# Patient Record
Sex: FEMALE
[De-identification: names, ages, dates, MRNs, and addresses within clinical notes are randomized per-mention and may not be internally consistent; named-entity substitution may affect disease eponyms.]

---

## 2020-01-23 ENCOUNTER — APPOINTMENT (OUTPATIENT)
Dept: OTOLARYNGOLOGY | Facility: CLINIC | Age: 30
End: 2020-01-23
Payer: SELF-PAY

## 2020-01-23 VITALS
BODY MASS INDEX: 21.71 KG/M2 | HEART RATE: 80 BPM | SYSTOLIC BLOOD PRESSURE: 98 MMHG | WEIGHT: 118 LBS | DIASTOLIC BLOOD PRESSURE: 59 MMHG | HEIGHT: 62 IN

## 2020-01-23 DIAGNOSIS — H93.293 OTHER ABNORMAL AUDITORY PERCEPTIONS, BILATERAL: ICD-10-CM

## 2020-01-23 DIAGNOSIS — Z80.9 FAMILY HISTORY OF MALIGNANT NEOPLASM, UNSPECIFIED: ICD-10-CM

## 2020-01-23 DIAGNOSIS — Z78.9 OTHER SPECIFIED HEALTH STATUS: ICD-10-CM

## 2020-01-23 DIAGNOSIS — H93.13 TINNITUS, BILATERAL: ICD-10-CM

## 2020-01-23 PROBLEM — Z00.00 ENCOUNTER FOR PREVENTIVE HEALTH EXAMINATION: Status: ACTIVE | Noted: 2020-01-23

## 2020-01-23 PROCEDURE — 99203 OFFICE O/P NEW LOW 30 MIN: CPT

## 2020-01-23 PROCEDURE — 92550 TYMPANOMETRY & REFLEX THRESH: CPT

## 2020-01-23 PROCEDURE — 92557 COMPREHENSIVE HEARING TEST: CPT

## 2020-01-23 RX ORDER — OMEGA-3/DHA/EPA/FISH OIL 300-1000MG
CAPSULE ORAL
Refills: 0 | Status: ACTIVE | COMMUNITY

## 2020-01-23 RX ORDER — MELATONIN 3 MG
TABLET ORAL
Refills: 0 | Status: ACTIVE | COMMUNITY

## 2020-01-23 NOTE — CONSULT LETTER
[Dear  ___] : Dear  [unfilled], [Consult Letter:] : I had the pleasure of evaluating your patient, [unfilled]. [Please see my note below.] : Please see my note below. [Consult Closing:] : Thank you very much for allowing me to participate in the care of this patient.  If you have any questions, please do not hesitate to contact me. [Sincerely,] : Sincerely, [FreeTextEntry3] : Aria El MD\par

## 2020-01-23 NOTE — ASSESSMENT
[FreeTextEntry1] : She has a one week history of bilateral mild tinnitus which occurs at night. Her ear exam and audiogram are normal. We discussed possible etiologies. She has been under more stress> she has muscle tension in the neck and TMJ dysfunction. This could be causing exacerbation of the tinnitus\par \par PLAN\par \par - findings and management options discussed with the patient. \par - Good aural hygiene.\par - noise precautions\par - repeat audiogram in one year as needed. \par - literature regarding tinnitus given\par - Avoid substances that can make tinnitus worse.  \par -  she could consider trying a short burst of steroids\par - nasal steroid spray and decongestant if she has nasal congestions and ETD\par - She may use a white noise maker or leave the radio or tv on until she falls asleep at night\par -She may try massage or physical therapy for the muscle tension. Consider workup for TMJ dysfunction if her symptoms persist\par -  I asked her to call me or return in a couple weeks if her symptoms are not improving. She should call and return earlier if her symptoms worsen

## 2020-01-23 NOTE — HISTORY OF PRESENT ILLNESS
[de-identified] : ISABEL LLANES is a 29 year patient With a one-week history of mild bilateral tinnitus. It occurs at night. She has no otalgia, otorrhea, or dizziness. She was not exposed to a loud noises and was not sick. She does have a little bit of congestion. She has been stressed and is under tension. She may have TMJ dysfunction. She had recurrent ear infections as a child but has no history of prior otologic surgery, ear/head trauma, or noise exposure. Her mother has hearing loss due to an ear infection